# Patient Record
Sex: FEMALE | Race: WHITE
[De-identification: names, ages, dates, MRNs, and addresses within clinical notes are randomized per-mention and may not be internally consistent; named-entity substitution may affect disease eponyms.]

---

## 2018-04-10 ENCOUNTER — HOSPITAL ENCOUNTER (EMERGENCY)
Age: 53
Discharge: HOME | End: 2018-04-10

## 2018-04-10 ENCOUNTER — HOSPITAL ENCOUNTER (EMERGENCY)
Dept: HOSPITAL 91 - E/R | Age: 53
Discharge: HOME | End: 2018-04-10
Payer: COMMERCIAL

## 2018-04-10 DIAGNOSIS — R10.31: Primary | ICD-10-CM

## 2018-04-10 LAB
ADD MAN DIFF?: NO
ADD UMIC: YES
ALANINE AMINOTRANSFERASE: 41 IU/L (ref 13–69)
ALBUMIN/GLOBULIN RATIO: 1.43
ALBUMIN: 4.6 G/DL (ref 3.3–4.9)
ALKALINE PHOSPHATASE: 113 IU/L (ref 42–121)
ANION GAP: 15 (ref 8–16)
ASPARTATE AMINO TRANSFERASE: 27 IU/L (ref 15–46)
BASOPHIL #: 0.1 10^3/UL (ref 0–0.1)
BASOPHILS %: 0.9 % (ref 0–2)
BILIRUBIN,DIRECT: 0 MG/DL (ref 0–0.2)
BILIRUBIN,TOTAL: 0 MG/DL (ref 0.2–1.3)
BLOOD UREA NITROGEN: 17 MG/DL (ref 7–20)
CALCIUM: 9.6 MG/DL (ref 8.4–10.2)
CARBON DIOXIDE: 27 MMOL/L (ref 21–31)
CHLORIDE: 104 MMOL/L (ref 97–110)
CREATININE: 0.66 MG/DL (ref 0.44–1)
EOSINOPHILS #: 0.1 10^3/UL (ref 0–0.5)
EOSINOPHILS %: 2 % (ref 0–7)
GLOBULIN: 3.2 G/DL (ref 1.3–3.2)
GLUCOSE: 88 MG/DL (ref 70–220)
HEMATOCRIT: 39.2 % (ref 37–47)
HEMOGLOBIN: 13.6 G/DL (ref 12–16)
LIPASE: 124 U/L (ref 23–300)
LYMPHOCYTES #: 3.1 10^3/UL (ref 0.8–2.9)
LYMPHOCYTES %: 47.5 % (ref 15–51)
MEAN CORPUSCULAR HEMOGLOBIN: 30.2 PG (ref 29–33)
MEAN CORPUSCULAR HGB CONC: 34.7 G/DL (ref 32–37)
MEAN CORPUSCULAR VOLUME: 86.9 FL (ref 82–101)
MEAN PLATELET VOLUME: 10.4 FL (ref 7.4–10.4)
MONOCYTE #: 0.5 10^3/UL (ref 0.3–0.9)
MONOCYTES %: 8.1 % (ref 0–11)
NEUTROPHIL #: 2.7 10^3/UL (ref 1.6–7.5)
NEUTROPHILS %: 41.3 % (ref 39–77)
NUCLEATED RED BLOOD CELLS #: 0 10^3/UL (ref 0–0)
NUCLEATED RED BLOOD CELLS%: 0 /100WBC (ref 0–0)
PLATELET COUNT: 277 10^3/UL (ref 140–415)
POTASSIUM: 3.8 MMOL/L (ref 3.5–5.1)
RED BLOOD COUNT: 4.51 10^6/UL (ref 4.2–5.4)
RED CELL DISTRIBUTION WIDTH: 12.6 % (ref 11.5–14.5)
SODIUM: 142 MMOL/L (ref 135–144)
TOTAL PROTEIN: 7.8 G/DL (ref 6.1–8.1)
UR ASCORBIC ACID: NEGATIVE MG/DL
UR BACTERIA: (no result) /HPF
UR BILIRUBIN (DIP): NEGATIVE MG/DL
UR BLOOD (DIP): (no result) MG/DL
UR CLARITY: CLEAR
UR COLOR: YELLOW
UR GLUCOSE (DIP): NEGATIVE MG/DL
UR KETONES (DIP): NEGATIVE MG/DL
UR LEUKOCYTE ESTERASE (DIP): NEGATIVE LEU/UL
UR NITRITE (DIP): NEGATIVE MG/DL
UR PH (DIP): 5 (ref 5–9)
UR RBC: 16 /HPF (ref 0–5)
UR SPECIFIC GRAVITY (DIP): 1.03 (ref 1–1.03)
UR SQUAMOUS EPITHELIAL CELL: (no result) /HPF
UR TOTAL PROTEIN (DIP): NEGATIVE MG/DL
UR UROBILINOGEN (DIP): NEGATIVE MG/DL
UR WBC: 1 /HPF (ref 0–5)
WHITE BLOOD COUNT: 6.4 10^3/UL (ref 4.8–10.8)

## 2018-04-10 PROCEDURE — 83690 ASSAY OF LIPASE: CPT

## 2018-04-10 PROCEDURE — 96374 THER/PROPH/DIAG INJ IV PUSH: CPT

## 2018-04-10 PROCEDURE — 74177 CT ABD & PELVIS W/CONTRAST: CPT

## 2018-04-10 PROCEDURE — 80053 COMPREHEN METABOLIC PANEL: CPT

## 2018-04-10 PROCEDURE — 76856 US EXAM PELVIC COMPLETE: CPT

## 2018-04-10 PROCEDURE — 96375 TX/PRO/DX INJ NEW DRUG ADDON: CPT

## 2018-04-10 PROCEDURE — 76830 TRANSVAGINAL US NON-OB: CPT

## 2018-04-10 PROCEDURE — 85025 COMPLETE CBC W/AUTO DIFF WBC: CPT

## 2018-04-10 PROCEDURE — 81001 URINALYSIS AUTO W/SCOPE: CPT

## 2018-04-10 PROCEDURE — 36415 COLL VENOUS BLD VENIPUNCTURE: CPT

## 2018-04-10 PROCEDURE — 99285 EMERGENCY DEPT VISIT HI MDM: CPT

## 2018-04-10 RX ADMIN — VASOPRESSIN 1 ML/S: 20 INJECTION, SOLUTION INTRAMUSCULAR; SUBCUTANEOUS at 18:42

## 2018-04-10 RX ADMIN — ONDANSETRON HYDROCHLORIDE 1 MG: 2 INJECTION, SOLUTION INTRAMUSCULAR; INTRAVENOUS at 16:50

## 2018-04-10 RX ADMIN — SODIUM CHLORIDE 1 ML: 9 INJECTION, SOLUTION INTRAMUSCULAR; INTRAVENOUS; SUBCUTANEOUS at 18:42

## 2018-04-10 RX ADMIN — IOHEXOL 1 ML: 300 INJECTION, SOLUTION INTRAVENOUS at 18:43

## 2018-06-22 ENCOUNTER — HOSPITAL ENCOUNTER (OUTPATIENT)
Dept: HOSPITAL 91 - SDS | Age: 53
Discharge: HOME | End: 2018-06-22
Payer: COMMERCIAL

## 2018-06-22 ENCOUNTER — HOSPITAL ENCOUNTER (OUTPATIENT)
Age: 53
Discharge: HOME | End: 2018-06-22

## 2018-06-22 DIAGNOSIS — N60.11: Primary | ICD-10-CM

## 2018-06-22 DIAGNOSIS — D17.1: ICD-10-CM

## 2018-06-22 DIAGNOSIS — N60.31: ICD-10-CM

## 2018-06-22 PROCEDURE — 11406 EXC TR-EXT B9+MARG >4.0 CM: CPT

## 2018-06-22 PROCEDURE — 93005 ELECTROCARDIOGRAM TRACING: CPT

## 2018-06-22 PROCEDURE — 88307 TISSUE EXAM BY PATHOLOGIST: CPT

## 2018-06-22 PROCEDURE — 71045 X-RAY EXAM CHEST 1 VIEW: CPT

## 2018-06-22 RX ADMIN — HYDROCODONE BITARTRATE AND ACETAMINOPHEN 1 TAB: 5; 325 TABLET ORAL at 10:50

## 2018-06-22 RX ADMIN — BUPIVACAINE HYDROCHLORIDE AND EPINEPHRINE BITARTRATE 1 ML: 2.5; .005 INJECTION, SOLUTION EPIDURAL; INFILTRATION; INTRACAUDAL; PERINEURAL at 08:53

## 2018-10-29 ENCOUNTER — HOSPITAL ENCOUNTER (EMERGENCY)
Dept: HOSPITAL 91 - FTE | Age: 53
LOS: 1 days | Discharge: HOME | End: 2018-10-30
Payer: COMMERCIAL

## 2018-10-29 ENCOUNTER — HOSPITAL ENCOUNTER (EMERGENCY)
Age: 53
LOS: 1 days | Discharge: HOME | End: 2018-10-30

## 2018-10-29 DIAGNOSIS — J20.9: Primary | ICD-10-CM

## 2018-10-29 PROCEDURE — 99283 EMERGENCY DEPT VISIT LOW MDM: CPT

## 2019-04-19 ENCOUNTER — HOSPITAL ENCOUNTER (OUTPATIENT)
Dept: HOSPITAL 10 - GIL | Age: 54
Discharge: HOME | End: 2019-04-19
Attending: INTERNAL MEDICINE
Payer: COMMERCIAL

## 2019-04-19 ENCOUNTER — HOSPITAL ENCOUNTER (OUTPATIENT)
Dept: HOSPITAL 91 - GIL | Age: 54
Discharge: HOME | End: 2019-04-19
Payer: COMMERCIAL

## 2019-04-19 VITALS — SYSTOLIC BLOOD PRESSURE: 107 MMHG | DIASTOLIC BLOOD PRESSURE: 65 MMHG | RESPIRATION RATE: 17 BRPM | HEART RATE: 72 BPM

## 2019-04-19 VITALS
HEIGHT: 60 IN | BODY MASS INDEX: 23.85 KG/M2 | WEIGHT: 121.47 LBS | HEIGHT: 60 IN | WEIGHT: 121.47 LBS | BODY MASS INDEX: 23.85 KG/M2

## 2019-04-19 VITALS — HEART RATE: 65 BPM | DIASTOLIC BLOOD PRESSURE: 74 MMHG | SYSTOLIC BLOOD PRESSURE: 124 MMHG | RESPIRATION RATE: 24 BRPM

## 2019-04-19 DIAGNOSIS — K64.8: ICD-10-CM

## 2019-04-19 DIAGNOSIS — Z12.11: Primary | ICD-10-CM

## 2019-04-19 PROCEDURE — 45380 COLONOSCOPY AND BIOPSY: CPT

## 2019-04-19 PROCEDURE — 88305 TISSUE EXAM BY PATHOLOGIST: CPT

## 2019-04-19 NOTE — CONS
DATE OF ADMISSION: 04/19/2019

DATE OF CONSULTATION:  

 

 

 

PATIENT NAME: LEOLA ANNA

 

TYPE OF CONSULTATION:  Preoperative gastroenterology.

 

Dear Dr. Solis:

 

I thank you very much for this kind referral.

 

HISTORY OF PRESENT ILLNESS:  Ms. Leola Anna is a 53-year-old female patient who has been referred t
o me for further evaluation of change in the bowel habit with episodes of diarrhea.  No past history 
of colon neoplasm.  The patient never had screening colonoscopy.  Appetite is good and no weight loss
.  No upper abdominal pain.  Not on nonsteroidal anti-inflammatory agents.  No history of gallstones 
or liver disease.  Not a hypertensive or diabetic.  No heart disease.  Has bronchial asthma.  No kidn
ey disease.  Status post surgery for tubal pregnancy.

 

SOCIAL HISTORY:  Nonsmoker.  No alcohol abuse.

 

FAMILY HISTORY:  No family history of gastrointestinal tract neoplasm.

 

ALLERGIES:  NO DRUG ALLERGIES.

 

MEDICATIONS:  Flovent inhaler.

 

PHYSICAL EXAMINATION:

VITAL SIGNS:  She is 5 feet tall and weighs 124 pounds.

HEART:  Normal heart sounds.

LUNGS:  Clear.

ABDOMEN:  Soft, no masses.  Normal bowel sounds.

NEUROLOGIC:  Normal.

 

IMPRESSION:

1.  Change in the bowel habit with episodes of diarrhea.

2.  The patient never had screening colonoscopy.

3.  Bronchial asthma.

4.  Status post surgery for tubal pregnancy.

 

PLAN:  Screening colonoscopy.

 

The procedure and possible complications are well explained to the patient.  She understands and cons
ents to the procedure.

 

I thank you once again.

 

With warmest personal regards,

 

 

Dictated By: NATALIIA PALOMO/NEFTALI

DD:    03/25/2019 19:13:47

DT:    03/25/2019 20:08:37

Conf#: [id number]

DID#:  [id number]

## 2019-09-25 ENCOUNTER — HOSPITAL ENCOUNTER (EMERGENCY)
Dept: HOSPITAL 91 - FTE | Age: 54
Discharge: HOME | End: 2019-09-25
Payer: COMMERCIAL

## 2019-09-25 ENCOUNTER — HOSPITAL ENCOUNTER (EMERGENCY)
Dept: HOSPITAL 10 - FTE | Age: 54
Discharge: HOME | End: 2019-09-25
Payer: COMMERCIAL

## 2019-09-25 VITALS — HEART RATE: 78 BPM | DIASTOLIC BLOOD PRESSURE: 74 MMHG | SYSTOLIC BLOOD PRESSURE: 148 MMHG | RESPIRATION RATE: 20 BRPM

## 2019-09-25 VITALS — WEIGHT: 122.58 LBS | BODY MASS INDEX: 28.37 KG/M2 | HEIGHT: 55 IN

## 2019-09-25 DIAGNOSIS — J45.909: ICD-10-CM

## 2019-09-25 DIAGNOSIS — Z87.891: ICD-10-CM

## 2019-09-25 DIAGNOSIS — R10.31: Primary | ICD-10-CM

## 2019-09-25 PROCEDURE — 80053 COMPREHEN METABOLIC PANEL: CPT

## 2019-09-25 PROCEDURE — 81001 URINALYSIS AUTO W/SCOPE: CPT

## 2019-09-25 PROCEDURE — 96375 TX/PRO/DX INJ NEW DRUG ADDON: CPT

## 2019-09-25 PROCEDURE — 96374 THER/PROPH/DIAG INJ IV PUSH: CPT

## 2019-09-25 PROCEDURE — 85025 COMPLETE CBC W/AUTO DIFF WBC: CPT

## 2019-09-25 PROCEDURE — 76856 US EXAM PELVIC COMPLETE: CPT

## 2019-09-25 PROCEDURE — 99285 EMERGENCY DEPT VISIT HI MDM: CPT

## 2019-09-25 RX ADMIN — FAMOTIDINE 1 MG: 10 INJECTION, SOLUTION INTRAVENOUS at 15:46

## 2019-09-25 RX ADMIN — KETOROLAC TROMETHAMINE 1 MG: 15 INJECTION, SOLUTION INTRAMUSCULAR; INTRAVENOUS at 15:46

## 2019-09-25 RX ADMIN — LIDOCAINE HYDROCHLORIDE 1 MLS/HR: 10 INJECTION, SOLUTION EPIDURAL; INFILTRATION; INTRACAUDAL; PERINEURAL at 15:46

## 2019-09-25 RX ADMIN — ONDANSETRON HYDROCHLORIDE 1 MG: 2 INJECTION, SOLUTION INTRAMUSCULAR; INTRAVENOUS at 15:46
